# Patient Record
Sex: FEMALE | Race: OTHER
[De-identification: names, ages, dates, MRNs, and addresses within clinical notes are randomized per-mention and may not be internally consistent; named-entity substitution may affect disease eponyms.]

---

## 2023-11-03 PROBLEM — Z00.00 ENCOUNTER FOR PREVENTIVE HEALTH EXAMINATION: Status: ACTIVE | Noted: 2023-11-03

## 2023-11-06 ENCOUNTER — APPOINTMENT (OUTPATIENT)
Dept: ORTHOPEDIC SURGERY | Facility: CLINIC | Age: 38
End: 2023-11-06

## 2024-12-11 ENCOUNTER — TRANSCRIPTION ENCOUNTER (OUTPATIENT)
Age: 39
End: 2024-12-11

## 2024-12-11 ENCOUNTER — OUTPATIENT (OUTPATIENT)
Dept: EMERGENCY DEPT | Facility: HOSPITAL | Age: 39
LOS: 1 days | End: 2024-12-11
Payer: COMMERCIAL

## 2024-12-11 VITALS
OXYGEN SATURATION: 97 % | SYSTOLIC BLOOD PRESSURE: 133 MMHG | WEIGHT: 190.04 LBS | HEART RATE: 72 BPM | TEMPERATURE: 98 F | RESPIRATION RATE: 16 BRPM | HEIGHT: 66 IN | DIASTOLIC BLOOD PRESSURE: 81 MMHG

## 2024-12-11 VITALS
RESPIRATION RATE: 14 BRPM | SYSTOLIC BLOOD PRESSURE: 136 MMHG | HEART RATE: 90 BPM | OXYGEN SATURATION: 97 % | DIASTOLIC BLOOD PRESSURE: 81 MMHG

## 2024-12-11 DIAGNOSIS — H33.20 SEROUS RETINAL DETACHMENT, UNSPECIFIED EYE: ICD-10-CM

## 2024-12-11 DIAGNOSIS — Z98.890 OTHER SPECIFIED POSTPROCEDURAL STATES: Chronic | ICD-10-CM

## 2024-12-11 LAB
ANION GAP SERPL CALC-SCNC: 8 MMOL/L — SIGNIFICANT CHANGE UP (ref 5–17)
BASOPHILS # BLD AUTO: 0.03 K/UL — SIGNIFICANT CHANGE UP (ref 0–0.2)
BASOPHILS NFR BLD AUTO: 0.5 % — SIGNIFICANT CHANGE UP (ref 0–2)
BUN SERPL-MCNC: 10 MG/DL — SIGNIFICANT CHANGE UP (ref 7–23)
CALCIUM SERPL-MCNC: 9 MG/DL — SIGNIFICANT CHANGE UP (ref 8.4–10.5)
CHLORIDE SERPL-SCNC: 104 MMOL/L — SIGNIFICANT CHANGE UP (ref 96–108)
CO2 SERPL-SCNC: 27 MMOL/L — SIGNIFICANT CHANGE UP (ref 22–31)
CREAT SERPL-MCNC: 0.77 MG/DL — SIGNIFICANT CHANGE UP (ref 0.5–1.3)
EGFR: 100 ML/MIN/1.73M2 — SIGNIFICANT CHANGE UP
EOSINOPHIL # BLD AUTO: 0.31 K/UL — SIGNIFICANT CHANGE UP (ref 0–0.5)
EOSINOPHIL NFR BLD AUTO: 5.1 % — SIGNIFICANT CHANGE UP (ref 0–6)
GLUCOSE SERPL-MCNC: 97 MG/DL — SIGNIFICANT CHANGE UP (ref 70–99)
HCG SERPL-ACNC: <1 MIU/ML — SIGNIFICANT CHANGE UP
HCT VFR BLD CALC: 40.6 % — SIGNIFICANT CHANGE UP (ref 34.5–45)
HGB BLD-MCNC: 14 G/DL — SIGNIFICANT CHANGE UP (ref 11.5–15.5)
IMM GRANULOCYTES NFR BLD AUTO: 0.3 % — SIGNIFICANT CHANGE UP (ref 0–0.9)
LYMPHOCYTES # BLD AUTO: 1.27 K/UL — SIGNIFICANT CHANGE UP (ref 1–3.3)
LYMPHOCYTES # BLD AUTO: 20.8 % — SIGNIFICANT CHANGE UP (ref 13–44)
MCHC RBC-ENTMCNC: 31 PG — SIGNIFICANT CHANGE UP (ref 27–34)
MCHC RBC-ENTMCNC: 34.5 G/DL — SIGNIFICANT CHANGE UP (ref 32–36)
MCV RBC AUTO: 89.8 FL — SIGNIFICANT CHANGE UP (ref 80–100)
MONOCYTES # BLD AUTO: 0.32 K/UL — SIGNIFICANT CHANGE UP (ref 0–0.9)
MONOCYTES NFR BLD AUTO: 5.2 % — SIGNIFICANT CHANGE UP (ref 2–14)
NEUTROPHILS # BLD AUTO: 4.16 K/UL — SIGNIFICANT CHANGE UP (ref 1.8–7.4)
NEUTROPHILS NFR BLD AUTO: 68.1 % — SIGNIFICANT CHANGE UP (ref 43–77)
NRBC # BLD: 0 /100 WBCS — SIGNIFICANT CHANGE UP (ref 0–0)
PLATELET # BLD AUTO: 315 K/UL — SIGNIFICANT CHANGE UP (ref 150–400)
POTASSIUM SERPL-MCNC: 3.8 MMOL/L — SIGNIFICANT CHANGE UP (ref 3.5–5.3)
POTASSIUM SERPL-SCNC: 3.8 MMOL/L — SIGNIFICANT CHANGE UP (ref 3.5–5.3)
RBC # BLD: 4.52 M/UL — SIGNIFICANT CHANGE UP (ref 3.8–5.2)
RBC # FLD: 11.8 % — SIGNIFICANT CHANGE UP (ref 10.3–14.5)
SODIUM SERPL-SCNC: 139 MMOL/L — SIGNIFICANT CHANGE UP (ref 135–145)
WBC # BLD: 6.11 K/UL — SIGNIFICANT CHANGE UP (ref 3.8–10.5)
WBC # FLD AUTO: 6.11 K/UL — SIGNIFICANT CHANGE UP (ref 3.8–10.5)

## 2024-12-11 PROCEDURE — 84702 CHORIONIC GONADOTROPIN TEST: CPT

## 2024-12-11 PROCEDURE — 85025 COMPLETE CBC W/AUTO DIFF WBC: CPT

## 2024-12-11 PROCEDURE — 99285 EMERGENCY DEPT VISIT HI MDM: CPT

## 2024-12-11 PROCEDURE — 93005 ELECTROCARDIOGRAM TRACING: CPT

## 2024-12-11 PROCEDURE — 80048 BASIC METABOLIC PNL TOTAL CA: CPT

## 2024-12-11 PROCEDURE — 36415 COLL VENOUS BLD VENIPUNCTURE: CPT

## 2024-12-11 PROCEDURE — C1889: CPT

## 2024-12-11 PROCEDURE — 67108 REPAIR DETACHED RETINA: CPT | Mod: RT

## 2024-12-11 DEVICE — SLEEVE OVAL STYLE S3083: Type: IMPLANTABLE DEVICE | Site: RIGHT | Status: FUNCTIONAL

## 2024-12-11 DEVICE — LASER PROBE 23G CONSTELLATION: Type: IMPLANTABLE DEVICE | Site: RIGHT | Status: FUNCTIONAL

## 2024-12-11 DEVICE — GS C3F8 PERFLUOROPROPANE IOL 2.5 L 20GM: Type: IMPLANTABLE DEVICE | Site: RIGHT | Status: FUNCTIONAL

## 2024-12-11 DEVICE — STRIP SILICONE STYLE 41: Type: IMPLANTABLE DEVICE | Site: RIGHT | Status: FUNCTIONAL

## 2024-12-11 RX ORDER — ACETAMINOPHEN 500MG 500 MG/1
1000 TABLET, COATED ORAL ONCE
Refills: 0 | Status: DISCONTINUED | OUTPATIENT
Start: 2024-12-11 | End: 2024-12-11

## 2024-12-11 RX ORDER — 0.9 % SODIUM CHLORIDE 0.9 %
1000 INTRAVENOUS SOLUTION INTRAVENOUS
Refills: 0 | Status: DISCONTINUED | OUTPATIENT
Start: 2024-12-11 | End: 2024-12-11

## 2024-12-11 NOTE — ED PROVIDER NOTE - DIFFERENTIAL DIAGNOSIS
Patient presenting to the emergency room for admission for right retinal detachment.  Will obtain screening labs and admit Differential Diagnosis

## 2024-12-11 NOTE — ASU DISCHARGE PLAN (ADULT/PEDIATRIC) - FINANCIAL ASSISTANCE
[facility] provides services at a reduced cost to those who are determined to be eligible through [facility]’s financial assistance program. Information regarding [facility]’s financial assistance program can be found by going to [email or web address] or by calling [phone].

## 2024-12-11 NOTE — ASU PREOP CHECKLIST - HEIGHT IN CM
Please call patient.  3/27/2024    Report shows simple cyst of the left ovary.  They were unable to see the right ovary.  The cyst is likely benign, but I would recommend a blood test to make sure were not missing a cancer.  An order for Ca1 25 was placed.      Thank you 167.64

## 2024-12-11 NOTE — ASU DISCHARGE PLAN (ADULT/PEDIATRIC) - PROVIDER TOKENS
FREE:[LAST:[Nuzhat],FIRST:[Kvng],PHONE:[phone]],FAX:[(   )    -],ADDRESS:[[address] [place] [address]],SCHEDULEDAPPT:[12/12/2024],SCHEDULEDAPPTTIME:[10:30 AM]]

## 2024-12-11 NOTE — ASU PATIENT PROFILE, ADULT - NS TRANSFER PATIENT BELONGINGS
Cell Phone/PDA (specify)/Jewelry/Money (specify)/Clothing Wrist watch, post earring with clear white stone, yellow mtal hoop earrings, wallet, 5 credit cards, 1 debit card, 1 [place] license, 1 pair of ear buds./Cell Phone/PDA (specify)/Jewelry/Money (specify)/Clothing

## 2024-12-11 NOTE — ASU DISCHARGE PLAN (ADULT/PEDIATRIC) - CARE PROVIDER_API CALL
Kvng Noland  [address] [place] [address] [address]  [place] [id number]  Phone: [phone]  Fax: (   )    -  Scheduled Appointment: 12/12/2024 10:30 AM

## 2024-12-11 NOTE — ASU PATIENT PROFILE, ADULT - WHEN WAS YOUR LAST VACCINATION? MONTH
Detail Level: Detailed January Was A Bandage Applied: Yes Punch Size In Mm: 3 Biopsy Type: H and E Anesthesia Type: 1% Xylocaine without epinephrine Anesthesia Volume In Cc (Will Not Render If 0): 0.5 Additional Anesthesia Volume In Cc (Will Not Render If 0): 0 Hemostasis: None Epidermal Sutures: 4-0 Nylon Number Of Epidermal Sutures (Optional): 1 Wound Care: Bacitracin Dressing: bandage Suture Removal: 10 days Patient Will Remove Sutures At Home?: No Lab: [facility] Lab Facility: [address] [facility] Consent: Written consent was obtained and risks were reviewed including but not limited to scarring, infection, bleeding, scabbing, incomplete removal, nerve damage and allergy to anesthesia. Post-Care Instructions: I reviewed with the patient in detail post-care instructions. Patient is to keep the biopsy site dry overnight, and then apply bacitracin twice daily until healed. Patient may apply hydrogen peroxide soaks to remove any crusting. Home Suture Removal Text: Patient was provided a home suture removal kit and will remove their sutures at home. If they have any questions or difficulties they will call the office. Notification Instructions: Patient will be notified of biopsy results. However, patient instructed to call the office if not contacted within 2 weeks. Billing Type: [employer] Information: Selecting Yes will display possible errors in your note based on the variables you have selected. This validation is only offered as a suggestion for you. PLEASE NOTE THAT THE VALIDATION TEXT WILL BE REMOVED WHEN YOU FINALIZE YOUR NOTE. IF YOU WANT TO FAX A PRELIMINARY NOTE YOU WILL NEED TO TOGGLE THIS TO 'NO' IF YOU DO NOT WANT IT IN YOUR FAXED NOTE. Biopsy Type: DIF Lab: [id number] Lab Facility: [id number] Home Suture Removal Text: Patient was provided a home suture removal kit and will remove their sutures at home.  If they have any questions or difficulties they will call the office. Billing Type: Third-Party Bill

## 2024-12-11 NOTE — ASU PATIENT PROFILE, ADULT - VISION (WITH CORRECTIVE LENSES IF THE PATIENT USUALLY WEARS THEM):
Right eye just sees light./Partially impaired: cannot see medication labels or newsprint, but can see obstacles in path, and the surrounding layout; can count fingers at arm's length

## 2024-12-11 NOTE — CONSULT NOTE ADULT - ASSESSMENT
40 yo female, no pmh, presenting with right eye retinal detachment, for repair today  - no medical contraindication to procedure, is low cardiac risk for now risk procedure  - can continue home birth control  - further management as per ophtho, likely discharge home after procedure

## 2024-12-11 NOTE — ASU DISCHARGE PLAN (ADULT/PEDIATRIC) - NPI NUMBER (FOR SYSADMIN USE ONLY) :
Tolerated procedure well  Taken back to ER 12  Handoff report given to Franki RN  Patient's left sided drain dressing dry and intact- drainage is thicker tan/pink in color  Patient conversing with staff and , denies discomfort at this time   [UNKNOWN]

## 2024-12-11 NOTE — CONSULT NOTE ADULT - SUBJECTIVE AND OBJECTIVE BOX
HPI:  38 yo female, no pmh, presenting for right eye retinal detachment, started as floaters 1 week ago, progressed to having curtain blocking vision at 5 0'clock area. no trauma, no dizziness, headaches, nausea, vomiting, fever, chills. No prior issues with anesthesia, capable of moderate exercise.     PAST MEDICAL & SURGICAL HISTORY:  History of retinal tear  Left eye tx with laser.      H/O colonoscopy      Home Medications:  Blisovi FE 1/20 oral tablet: 1 tab(s) orally once a day (11 Dec 2024 12:42)            Allergies    No Known Allergies    Intolerances        SOCIAL HISTORY:  denies etoh, smoking, works in [employer]    FAMILY HISTORY:  Father - CAD and DM  Sister - DM    Vital Signs Last 24 Hrs  T(C): 36.9 (11 Dec 2024 10:53), Max: 36.9 (11 Dec 2024 10:53)  T(F): 98.4 (11 Dec 2024 10:53), Max: 98.4 (11 Dec 2024 10:53)  HR: 72 (11 Dec 2024 10:53) (72 - 72)  BP: 133/81 (11 Dec 2024 10:53) (133/81 - 133/81)  BP(mean): --  RR: 16 (11 Dec 2024 10:53) (16 - 16)  SpO2: 97% (11 Dec 2024 10:53) (97% - 97%)    Parameters below as of 11 Dec 2024 10:53  Patient On (Oxygen Delivery Method): room air          I&O's Detail    Daily Height in cm: 167.64 (11 Dec 2024 10:53)    Daily     REVIEW OF SYSTEMS:  as per hpi, other systems reviewed and are negative    PHYSICAL EXAM:    GENERAL: NAD, well-groomed, well-developed  HEAD:  Atraumatic, Normocephalic  EYES: EOMI, PERRLA,  ENMT: No tonsillar erythema, exudates, or enlargement; Moist mucous membranes, No lesions  NECK: Supple, No JVD,  NERVOUS SYSTEM:  Alert & Oriented X3, Good concentration; Motor Strength 5/5 B/L upper and lower extremities  CHEST/LUNG: Clear to auscultation bilaterally; No rales, rhonchi, wheezing, or rubs  HEART: Regular rate and rhythm; No murmurs, rubs, or gallops  ABDOMEN: Soft, Nontender, Nondistended; Bowel sounds present  EXTREMITIES:  2+ Peripheral Pulses, No clubbing, cyanosis, or edema  LYMPH: No lymphadenopathy   SKIN: No rashes or lesions      LABS:                        14.0   6.11  )-----------( 315      ( 11 Dec 2024 11:23 )             40.6     12-11    139  |  104  |  10  ----------------------------<  97  3.8   |  27  |  0.77    Ca    9.0      11 Dec 2024 11:23        Urinalysis Basic - ( 11 Dec 2024 11:23 )    Color: x / Appearance: x / SG: x / pH: x  Gluc: 97 mg/dL / Ketone: x  / Bili: x / Urobili: x   Blood: x / Protein: x / Nitrite: x   Leuk Esterase: x / RBC: x / WBC x   Sq Epi: x / Non Sq Epi: x / Bacteria: x              RADIOLOGY & ADDITIONAL STUDIES:    EKG: NSR, no acute st changes

## 2024-12-11 NOTE — ED PROVIDER NOTE - OBJECTIVE STATEMENT
Patient is a 39-year-old female presents to the emerged serum with a right retinal detachment.  Patient with no significant past medical history only takes vitamins and birth control.  Does wear contacts and glasses.  Noticed a week ago she had increased floaters to the right eye vision progressively worsened and now she has minimal to no vision out of the right eye only some in the upper visual field.  Denies any acute trauma or eye pain.  Denies any headache nausea vomiting chest pain shortness of breath or abdominal pain.  He followed up with ophthalmology and was diagnosed with a right retinal detachment.  Presents today for operative intervention.  Last p.o. intake was 9 PM.

## 2024-12-11 NOTE — ED PROVIDER NOTE - CLINICAL SUMMARY MEDICAL DECISION MAKING FREE TEXT BOX
Patient is a 39-year-old female presents to the emerged serum with a right retinal detachment.  Patient with no significant past medical history only takes vitamins and birth control.  Does wear contacts and glasses.  Noticed a week ago she had increased floaters to the right eye vision progressively worsened and now she has minimal to no vision out of the right eye only some in the upper visual field.  Denies any acute trauma or eye pain.  Denies any headache nausea vomiting chest pain shortness of breath or abdominal pain.  He followed up with ophthalmology and was diagnosed with a right retinal detachment.  Presents today for operative intervention.  Last p.o. intake was 9 PM. Patient presenting to the emergency room for admission for right retinal detachment.  Will obtain screening labs and admit. Independent review of EKG reveals a normal sinus rhythm at 74 bpm.

## 2024-12-12 RX ORDER — NORETHINDRONE ACETATE AND ETHINYL ESTRADIOL AND FERROUS FUMARATE 1MG-20(24)
1 KIT ORAL
Refills: 0 | DISCHARGE

## (undated) DEVICE — SUT PLAIN GUT 6-0 18" TG140-8

## (undated) DEVICE — PACK CONSTELLATION POST 25G 20K

## (undated) DEVICE — ELCTR BIPOLAR CORD J&J 12FT DISP

## (undated) DEVICE — DRAPE MICROSCOPE RESIGHT

## (undated) DEVICE — BLADE SCLERAL #57

## (undated) DEVICE — SOL IRR SALT BSS PLUS 500ML

## (undated) DEVICE — SOL BALANCE SALT 15ML

## (undated) DEVICE — DIATHERMY PROBE 25GA

## (undated) DEVICE — AUTO GAS FILL CONSTELLATION

## (undated) DEVICE — PACK VITRECTOMY

## (undated) DEVICE — SUT VICRYL 8-0 12" TG140-8 DA

## (undated) DEVICE — CANNULA ALCON SOFT TIP 25G

## (undated) DEVICE — DRAPE OPHTHALMIC W POUCH

## (undated) DEVICE — KNIFE SPECIALTY BLADE TIP LAMELLAR BLADE ANGL BVL UP 2.3MM

## (undated) DEVICE — SUT SILK 2-0 12-18"

## (undated) DEVICE — VENODYNE/SCD SLEEVE CALF MEDIUM

## (undated) DEVICE — CONSTELLATION TOTAL PLUS PAK 23G

## (undated) DEVICE — CANNULA ALCON SOFT TIP 23G

## (undated) DEVICE — WARMING BLANKET LOWER ADULT

## (undated) DEVICE — SOL SYR OPHTHALMIC TISSUEBLUE BRILLIANT BLUE G 0.025%